# Patient Record
Sex: FEMALE | Race: WHITE | NOT HISPANIC OR LATINO | Employment: UNEMPLOYED | URBAN - METROPOLITAN AREA
[De-identification: names, ages, dates, MRNs, and addresses within clinical notes are randomized per-mention and may not be internally consistent; named-entity substitution may affect disease eponyms.]

---

## 2023-02-15 ENCOUNTER — PREP FOR SURGERY (OUTPATIENT)
Dept: SURGERY | Facility: SURGERY CENTER | Age: 48
End: 2023-02-15
Payer: COMMERCIAL

## 2023-02-15 ENCOUNTER — OFFICE VISIT (OUTPATIENT)
Dept: GASTROENTEROLOGY | Facility: CLINIC | Age: 48
End: 2023-02-15
Payer: COMMERCIAL

## 2023-02-15 VITALS
SYSTOLIC BLOOD PRESSURE: 140 MMHG | TEMPERATURE: 98 F | OXYGEN SATURATION: 99 % | HEART RATE: 105 BPM | WEIGHT: 200.32 LBS | BODY MASS INDEX: 34.2 KG/M2 | DIASTOLIC BLOOD PRESSURE: 82 MMHG | HEIGHT: 64 IN

## 2023-02-15 DIAGNOSIS — Z12.11 ENCOUNTER FOR SCREENING FOR MALIGNANT NEOPLASM OF COLON: Primary | ICD-10-CM

## 2023-02-15 DIAGNOSIS — K62.5 BRIGHT RED BLOOD PER RECTUM: Primary | ICD-10-CM

## 2023-02-15 DIAGNOSIS — K62.5 BRIGHT RED BLOOD PER RECTUM: ICD-10-CM

## 2023-02-15 DIAGNOSIS — R15.1 FECAL SMEARING: ICD-10-CM

## 2023-02-15 PROCEDURE — 99204 OFFICE O/P NEW MOD 45 MIN: CPT

## 2023-02-15 RX ORDER — AMLODIPINE BESYLATE 10 MG/1
1 TABLET ORAL DAILY
COMMUNITY
Start: 2022-12-07

## 2023-02-15 RX ORDER — SODIUM CHLORIDE 0.9 % (FLUSH) 0.9 %
3 SYRINGE (ML) INJECTION EVERY 12 HOURS SCHEDULED
Status: CANCELLED | OUTPATIENT
Start: 2023-02-15

## 2023-02-15 RX ORDER — CHLORAL HYDRATE 500 MG
CAPSULE ORAL
COMMUNITY

## 2023-02-15 RX ORDER — ATORVASTATIN CALCIUM 10 MG/1
TABLET, FILM COATED ORAL
COMMUNITY
Start: 2023-02-13

## 2023-02-15 RX ORDER — SODIUM CHLORIDE, SODIUM LACTATE, POTASSIUM CHLORIDE, CALCIUM CHLORIDE 600; 310; 30; 20 MG/100ML; MG/100ML; MG/100ML; MG/100ML
30 INJECTION, SOLUTION INTRAVENOUS CONTINUOUS PRN
Status: CANCELLED | OUTPATIENT
Start: 2023-02-15

## 2023-02-15 RX ORDER — SODIUM CHLORIDE 0.9 % (FLUSH) 0.9 %
10 SYRINGE (ML) INJECTION AS NEEDED
Status: CANCELLED | OUTPATIENT
Start: 2023-02-15

## 2023-02-15 RX ORDER — LOSARTAN POTASSIUM 100 MG/1
TABLET ORAL
COMMUNITY
Start: 2023-02-13

## 2023-02-15 NOTE — PATIENT INSTRUCTIONS
Schedule colonoscopy, orders placed.    Follow-up visit after colonoscopy to review findings and make additional recommendations if needed.       We recommend starting a daily fiber supplement such as metamucil     These can be purchased over-the-counter, generic brand is fine.  Fiber supplements can take 12 to 72 hours to start working. Make sure to drink 6 to 12 ounces of water or noncarbonated beverage with fiber supplement.     Recommended starting with half of product listed daily dose for 7 days to help reduce risk of abdominal bloating/gas and allow your body to acclimate to fiber diet intake.  If you do not experience any of these adverse effects may increase to daily dose listed on product.    Hemorrhoids:    Hemorrhoids are enlarged or swollen veins in the lower rectum. The most common symptoms of hemorrhoids are rectal bleeding, itching, and pain. You may be able to see or feel hemorrhoids around the outside of the anus, or they may be hidden from view, inside the rectum.    Hemorrhoids are common, occurring in both men and women. Although hemorrhoids do not usually cause serious health problems, they can be annoying and uncomfortable. Fortunately, treatments for hemorrhoids are available and can usually minimize the bothersome symptoms.     SYMPTOMS --     Hemorrhoids are more common in people who are older and in those who have diarrhea or pelvic tumors, during or after pregnancy, and in people who sit for prolonged periods of time and/or strain (push hard) to have a bowel movement.    Symptoms of hemorrhoids can include the following:  ?Painless rectal bleeding  ?Anal itching or pain  ?Tissue bulging around the anus      Itching --     Hemorrhoids commonly cause itching and irritation of skin around the anus.    Pain --     Hemorrhoids can become painful. If you develop severe pain, call your healthcare provider immediately because this may be a sign of a serious problem.      INITIAL HEMORRHOID  TREATMENT --     Type of medicine Examples Notes   Stool softener Docusate sodium (sample brand names: Colace, Docu, Stool Softener) Softens bowel movements  Helps avoid straining while sitting on toilet   We recommend starting a daily fiber supplement such as Citrucel, Metamucil, FiberCon or Benefiber.      These can be purchased over-the-counter, generic brand is fine.  Fiber supplements can take 12 to 72 hours to start working. Make sure to drink 6 to 12 ounces of water or noncarbonated beverage with fiber supplement.    Prevent hard dry stools which make hemorrhoids worse  Might reduce bleeding and other hemorrhoid symptoms  Needs to be taken with plenty of water (8 glasses of water a day)  suggest starting with a small dose and then increasing over time   Warm sitz baths     During a sitz bath, you soak the rectal area in warm water for 10 to 15 minutes two to three times daily. Sitz baths are available in most drugstores. It is also possible to use a bathtub and sit in 2 to 3 inches of warm water. Do not add soap, bubble bath, or other additives in the water. Sitz baths work by improving blood flow and relaxing the muscle around the anus, called the internal anal sphincter.     Medicines to dry or protect skin   Witch hazel (sample brand names: Tucks, Preparation H pads, Preparation H wipes)    Zinc oxide topical paste (sample brand names: Darnell's Butt Paste, Desitin)   Witch hazel wipes or unscented baby wipes can be used to clean the anus after a bowel movement    Zinc oxide also protects skin from irritation  Area must be gently cleaned and allowed to dry before using  Can apply after a sitz bath (soaking in warm, shallow water)     Steroid cream Hydrocortisone rectal cream (sample brand name: Preparation H hydrocortisone) Reduces swelling, and pain caused by hemorrhoids  Do not use for longer than a week  Do not use at all if you are pregnant unless your doctor or nurse tells you to   Medicines to  shrink hemorrhoids Phenylephrine ointment or suppository (sample brand names: Preparation H, Rectacaine) Phenylephrine shrinks swollen hemorrhoids, and relieves itching and discomfort for a few hours  Area must be gently cleaned and allowed to dry before applying

## 2023-02-15 NOTE — PROGRESS NOTES
"Chief Complaint   Patient presents with   • Hematochezia                  History of Present Illness    Patient is a 47 y.o. who presents to the office as a new patient for further evaluation of rectal bleeding after receiving referral from patient's primary care provider Dr. Sarkar.      Denies previous EGD or colonoscopy.    She presents today for further evaluation of rectal bleeding and fecal smearing.  Reports that over the past 8 months she will experience daily bowel movements that are soft formed in consistency without melena or hematochezia.      However in the days leading up to her monthly menstrual cycle her stools will become more firm in consistency and will experience rectal bleeding in toilet water-denies bright red blood with wiping or mixed within stools.    Denies unintentional weight loss or use of fiber supplementation at this time.  Declines rectal exam at this time and will be performed at time of colonoscopy.    She does have an obstetric history that includes delivery of an 8 pound fetus via  after nonprogression of labor.    Denies upper GI symptoms include heartburn, nausea, vomiting, or dysphagia.    Patient reports unsure of past family history secondary to adoption.     Result Review :             Vital Signs:   /82   Pulse 105   Temp 98 °F (36.7 °C)   Ht 162.6 cm (64\")   Wt 90.9 kg (200 lb 5.1 oz)   SpO2 99%   BMI 34.38 kg/m²     Body mass index is 34.38 kg/m².     Physical Exam  Vitals reviewed.   Constitutional:       General: She is not in acute distress.     Appearance: She is well-developed.   HENT:      Head: Normocephalic and atraumatic.   Pulmonary:      Effort: Pulmonary effort is normal. No respiratory distress.   Abdominal:      General: Abdomen is flat. Bowel sounds are normal. There is no distension.      Palpations: Abdomen is soft. There is no mass.      Tenderness: There is no abdominal tenderness. There is no guarding or rebound.      Hernia: No " hernia is present.   Skin:     General: Skin is dry.      Coloration: Skin is not pale.   Neurological:      Mental Status: She is alert and oriented to person, place, and time.   Psychiatric:         Thought Content: Thought content normal.           Assessment and Plan    Diagnoses and all orders for this visit:    1. Bright red blood per rectum (Primary)    2. Fecal smearing           Discussion:    Patient presents today for follow-up with concerns about rectal bleeding-denies Pain or trauma to this area. and fecal smearing, this is an undiagnosed problem.  I recommend proceeding with colonoscopy to assess for diverticulosis, inflammatory bowel disease, microscopic colitis, ulceration, AVM, polyp, hemorrhoids, or mass/malignancy.     For fecal smearing, recommend she start daily fiber supplementation to promote soft formed complete bowel movements.  Long discussion provided on pathophysiology of potential cause to fecal smearing and to consider referral to pelvic floor therapy based on response to daily fiber supplementation and findings on colonoscopy evaluation.    Patient is agreeable to the outlined above treatment plan.  Verbalizes understanding and will contact office for any new or worsening concerns.  All questions answered and support provided.        Patient Instructions     Schedule colonoscopy, orders placed.    Follow-up visit after colonoscopy to review findings and make additional recommendations if needed.       We recommend starting a daily fiber supplement such as metamucil     These can be purchased over-the-counter, generic brand is fine.  Fiber supplements can take 12 to 72 hours to start working. Make sure to drink 6 to 12 ounces of water or noncarbonated beverage with fiber supplement.     Recommended starting with half of product listed daily dose for 7 days to help reduce risk of abdominal bloating/gas and allow your body to acclimate to fiber diet intake.  If you do not experience any  of these adverse effects may increase to daily dose listed on product.    Hemorrhoids:    Hemorrhoids are enlarged or swollen veins in the lower rectum. The most common symptoms of hemorrhoids are rectal bleeding, itching, and pain. You may be able to see or feel hemorrhoids around the outside of the anus, or they may be hidden from view, inside the rectum.    Hemorrhoids are common, occurring in both men and women. Although hemorrhoids do not usually cause serious health problems, they can be annoying and uncomfortable. Fortunately, treatments for hemorrhoids are available and can usually minimize the bothersome symptoms.     SYMPTOMS --     Hemorrhoids are more common in people who are older and in those who have diarrhea or pelvic tumors, during or after pregnancy, and in people who sit for prolonged periods of time and/or strain (push hard) to have a bowel movement.    Symptoms of hemorrhoids can include the following:  ?Painless rectal bleeding  ?Anal itching or pain  ?Tissue bulging around the anus      Itching --     Hemorrhoids commonly cause itching and irritation of skin around the anus.    Pain --     Hemorrhoids can become painful. If you develop severe pain, call your healthcare provider immediately because this may be a sign of a serious problem.      INITIAL HEMORRHOID TREATMENT --     Type of medicine Examples Notes   Stool softener Docusate sodium (sample brand names: Colace, Docu, Stool Softener) Softens bowel movements  Helps avoid straining while sitting on toilet   We recommend starting a daily fiber supplement such as Citrucel, Metamucil, FiberCon or Benefiber.      These can be purchased over-the-counter, generic brand is fine.  Fiber supplements can take 12 to 72 hours to start working. Make sure to drink 6 to 12 ounces of water or noncarbonated beverage with fiber supplement.    Prevent hard dry stools which make hemorrhoids worse  Might reduce bleeding and other hemorrhoid symptoms  Needs  to be taken with plenty of water (8 glasses of water a day)  suggest starting with a small dose and then increasing over time   Warm sitz baths     During a sitz bath, you soak the rectal area in warm water for 10 to 15 minutes two to three times daily. Sitz baths are available in most drugstores. It is also possible to use a bathtub and sit in 2 to 3 inches of warm water. Do not add soap, bubble bath, or other additives in the water. Sitz baths work by improving blood flow and relaxing the muscle around the anus, called the internal anal sphincter.     Medicines to dry or protect skin   Witch hazel (sample brand names: Tucks, Preparation H pads, Preparation H wipes)    Zinc oxide topical paste (sample brand names: Darnell's Butt Paste, Desitin)   Witch hazel wipes or unscented baby wipes can be used to clean the anus after a bowel movement    Zinc oxide also protects skin from irritation  Area must be gently cleaned and allowed to dry before using  Can apply after a sitz bath (soaking in warm, shallow water)     Steroid cream Hydrocortisone rectal cream (sample brand name: Preparation H hydrocortisone) Reduces swelling, and pain caused by hemorrhoids  Do not use for longer than a week  Do not use at all if you are pregnant unless your doctor or nurse tells you to   Medicines to shrink hemorrhoids Phenylephrine ointment or suppository (sample brand names: Preparation H, Rectacaine) Phenylephrine shrinks swollen hemorrhoids, and relieves itching and discomfort for a few hours  Area must be gently cleaned and allowed to dry before applying                          EMR Dragon/Transcription Disclaimer:  This document has been Dictated utilizing Dragon dictation.

## 2023-02-16 NOTE — SIGNIFICANT NOTE
Education provided the Patient on the following:    - Nothing to Eat or Drink after MN the night before the procedure    - Avoid red/purple fluids while completing their bowel prep as ordered by physician  -Contact Gastrointerologist office for any questions about specific details regarding colon prep    -You will need to have someone drive you home after your colonoscopy and remain with you for 24 hours after the procedure  - The date of your Surgery, you may have one visitor at bedside or within 10-15 minutes of [facility]  -Please wear warm socks when you arrive for your colonoscopy  -Remove all jewelry and leave any valuables before arriving the day of your procedure (all will have to be removed before leaving preop)  -You will need to arrive at 1400 on 2/21/23 for your colonoscopy    -Feel free to contact us at: [phone] with any additional questions/concerns

## 2023-02-21 ENCOUNTER — ANESTHESIA (OUTPATIENT)
Dept: SURGERY | Facility: SURGERY CENTER | Age: 48
End: 2023-02-21
Payer: COMMERCIAL

## 2023-02-21 ENCOUNTER — HOSPITAL ENCOUNTER (OUTPATIENT)
Facility: SURGERY CENTER | Age: 48
Setting detail: HOSPITAL OUTPATIENT SURGERY
Discharge: HOME | End: 2023-02-21
Attending: INTERNAL MEDICINE | Admitting: INTERNAL MEDICINE
Payer: COMMERCIAL

## 2023-02-21 ENCOUNTER — ANESTHESIA EVENT (OUTPATIENT)
Dept: SURGERY | Facility: SURGERY CENTER | Age: 48
End: 2023-02-21
Payer: COMMERCIAL

## 2023-02-21 VITALS
WEIGHT: 200 LBS | TEMPERATURE: 97.5 F | HEART RATE: 79 BPM | RESPIRATION RATE: 16 BRPM | HEIGHT: 64 IN | DIASTOLIC BLOOD PRESSURE: 72 MMHG | OXYGEN SATURATION: 99 % | SYSTOLIC BLOOD PRESSURE: 126 MMHG | BODY MASS INDEX: 34.15 KG/M2

## 2023-02-21 DIAGNOSIS — R15.1 FECAL SMEARING: ICD-10-CM

## 2023-02-21 DIAGNOSIS — K62.5 BRIGHT RED BLOOD PER RECTUM: ICD-10-CM

## 2023-02-21 DIAGNOSIS — Z12.11 ENCOUNTER FOR SCREENING FOR MALIGNANT NEOPLASM OF COLON: ICD-10-CM

## 2023-02-21 LAB
B-HCG UR QL: NEGATIVE
EXPIRATION DATE: NORMAL
INTERNAL NEGATIVE CONTROL: NEGATIVE
INTERNAL POSITIVE CONTROL: POSITIVE
Lab: NORMAL

## 2023-02-21 PROCEDURE — 25010000002 PROPOFOL 10 MG/ML EMULSION: Performed by: NURSE ANESTHETIST, CERTIFIED REGISTERED

## 2023-02-21 PROCEDURE — 45385 COLONOSCOPY W/LESION REMOVAL: CPT | Performed by: INTERNAL MEDICINE

## 2023-02-21 PROCEDURE — 81025 URINE PREGNANCY TEST: CPT | Performed by: INTERNAL MEDICINE

## 2023-02-21 PROCEDURE — 88305 TISSUE EXAM BY PATHOLOGIST: CPT | Performed by: INTERNAL MEDICINE

## 2023-02-21 RX ORDER — PROPOFOL 10 MG/ML
VIAL (ML) INTRAVENOUS AS NEEDED
Status: DISCONTINUED | OUTPATIENT
Start: 2023-02-21 | End: 2023-02-21 | Stop reason: SURG

## 2023-02-21 RX ORDER — MAGNESIUM HYDROXIDE 1200 MG/15ML
LIQUID ORAL AS NEEDED
Status: DISCONTINUED | OUTPATIENT
Start: 2023-02-21 | End: 2023-02-21 | Stop reason: HOSPADM

## 2023-02-21 RX ORDER — SODIUM CHLORIDE, SODIUM LACTATE, POTASSIUM CHLORIDE, CALCIUM CHLORIDE 600; 310; 30; 20 MG/100ML; MG/100ML; MG/100ML; MG/100ML
30 INJECTION, SOLUTION INTRAVENOUS CONTINUOUS PRN
Status: DISCONTINUED | OUTPATIENT
Start: 2023-02-21 | End: 2023-02-21 | Stop reason: HOSPADM

## 2023-02-21 RX ORDER — SODIUM CHLORIDE 0.9 % (FLUSH) 0.9 %
10 SYRINGE (ML) INJECTION AS NEEDED
Status: DISCONTINUED | OUTPATIENT
Start: 2023-02-21 | End: 2023-02-21 | Stop reason: HOSPADM

## 2023-02-21 RX ORDER — LIDOCAINE HYDROCHLORIDE 20 MG/ML
INJECTION, SOLUTION INFILTRATION; PERINEURAL AS NEEDED
Status: DISCONTINUED | OUTPATIENT
Start: 2023-02-21 | End: 2023-02-21 | Stop reason: SURG

## 2023-02-21 RX ORDER — SODIUM CHLORIDE 0.9 % (FLUSH) 0.9 %
3 SYRINGE (ML) INJECTION EVERY 12 HOURS SCHEDULED
Status: DISCONTINUED | OUTPATIENT
Start: 2023-02-21 | End: 2023-02-21 | Stop reason: HOSPADM

## 2023-02-21 RX ADMIN — PROPOFOL 100 MG: 10 INJECTION, EMULSION INTRAVENOUS at 14:13

## 2023-02-21 RX ADMIN — PROPOFOL 180 MCG/KG/MIN: 10 INJECTION, EMULSION INTRAVENOUS at 14:13

## 2023-02-21 RX ADMIN — SODIUM CHLORIDE, SODIUM LACTATE, POTASSIUM CHLORIDE, AND CALCIUM CHLORIDE 30 ML/HR: .6; .31; .03; .02 INJECTION, SOLUTION INTRAVENOUS at 12:54

## 2023-02-21 RX ADMIN — LIDOCAINE HYDROCHLORIDE 30 MG: 20 INJECTION, SOLUTION INFILTRATION; PERINEURAL at 14:13

## 2023-02-21 NOTE — ANESTHESIA PREPROCEDURE EVALUATION
Anesthesia Evaluation     Patient summary reviewed and Nursing notes reviewed                Airway   Mallampati: I  No difficulty expected  Dental - normal exam     Pulmonary - normal exam   (-) not a smoker  Cardiovascular - normal exam    (+) hypertension, hyperlipidemia,       Neuro/Psych- negative ROS  GI/Hepatic/Renal/Endo    (+) obesity,  GI bleeding ,     Musculoskeletal     Abdominal    Substance History      OB/GYN          Other                        Anesthesia Plan    ASA 2     MAC       Anesthetic plan, risks, benefits, and alternatives have been provided, discussed and informed consent has been obtained with: patient.        CODE STATUS:

## 2023-02-21 NOTE — ANESTHESIA POSTPROCEDURE EVALUATION
"Patient: Katt Cornejo    Procedure Summary     Date: 02/21/23 Room / Location: [facility]    Anesthesia Start: 1410 Anesthesia Stop: 1435    Procedure: COLONOSCOPY with polypectomy Diagnosis:       Encounter for screening for malignant neoplasm of colon      Bright red blood per rectum      Fecal smearing      (Encounter for screening for malignant neoplasm of colon [Z12.11])      (Bright red blood per rectum [K62.5])      (Fecal smearing [R15.1])    Surgeons: Josep Limon MD Provider: Minna Beth MD    Anesthesia Type: MAC ASA Status: 2          Anesthesia Type: MAC    Vitals  Vitals Value Taken Time   /71 02/21/23 1443   Temp     Pulse 80 02/21/23 1443   Resp 16 02/21/23 1443   SpO2 99 % 02/21/23 1443           Post Anesthesia Care and Evaluation    Patient location during evaluation: bedside  Patient participation: complete - patient participated  Level of consciousness: awake  Pain management: adequate    Airway patency: patent  Anesthetic complications: No anesthetic complications  PONV Status: controlled  Cardiovascular status: acceptable  Respiratory status: acceptable  Hydration status: acceptable    Comments: /71 (BP Location: Left arm, Patient Position: Sitting)   Pulse 80   Temp 36.4 °C (97.5 °F) (Temporal)   Resp 16   Ht 162.6 cm (64\")   Wt 90.7 kg (200 lb)   LMP 01/30/2023 (Exact Date)   SpO2 99%   BMI 34.33 kg/m²         "

## 2023-02-23 LAB
LAB AP CASE REPORT: NORMAL
LAB AP CLINICAL INFORMATION: NORMAL
PATH REPORT.FINAL DX SPEC: NORMAL
PATH REPORT.GROSS SPEC: NORMAL

## 2023-04-04 ENCOUNTER — OFFICE VISIT (OUTPATIENT)
Dept: GASTROENTEROLOGY | Facility: CLINIC | Age: 48
End: 2023-04-04
Payer: COMMERCIAL

## 2023-04-04 VITALS
WEIGHT: 201.1 LBS | TEMPERATURE: 97.3 F | OXYGEN SATURATION: 99 % | BODY MASS INDEX: 34.33 KG/M2 | DIASTOLIC BLOOD PRESSURE: 100 MMHG | SYSTOLIC BLOOD PRESSURE: 140 MMHG | HEART RATE: 65 BPM | HEIGHT: 64 IN

## 2023-04-04 DIAGNOSIS — Z86.010 PERSONAL HISTORY OF COLONIC POLYPS: Primary | ICD-10-CM

## 2023-04-04 DIAGNOSIS — K62.5 BRIGHT RED BLOOD PER RECTUM: ICD-10-CM

## 2023-04-04 DIAGNOSIS — R15.1 FECAL SMEARING: ICD-10-CM

## 2023-04-04 PROCEDURE — 99213 OFFICE O/P EST LOW 20 MIN: CPT

## 2023-04-04 NOTE — PATIENT INSTRUCTIONS
colonoscopy for colon cancer screening in 3 years due February 2026.    We recommend starting a daily fiber supplement such as Metamucil, Benefiber, Citrucel    These can be purchased over-the-counter, generic brand is fine.  Fiber supplements can take 12 to 72 hours to start working. Make sure to drink 6 to 12 ounces of water or noncarbonated beverage with fiber supplement.     Recommended starting with half of product listed daily dose for 7 days to help reduce risk of abdominal bloating/gas and allow your body to acclimate to fiber diet intake.  If you do not experience any of these adverse effects may increase to daily dose listed on product.

## 2023-04-04 NOTE — PROGRESS NOTES
"Chief Complaint   Patient presents with   • Follow-up     Postprocedure follow-up           History of Present Illness    Patient is a 48 y.o. who presents to the office for follow up evaluation.  Last in office visit was on 2/15/2023 for rectal bleeding and fecal smearing.     On 2/21/2023 patient underwent colonoscopy evaluation with Dr. Limon and remarkable for a single 9 mm polyp in the sigmoid colon, nonbleeding internal hemorrhoids, medium mouth diverticula in the descending and ascending colon.    Biopsies remarkable for a tubulovillous adenoma with recommendation to undergo follow-up colonoscopy for colon cancer screening in 3 years due February 2026.    Patient presents to the office today for follow-up discussion after undergoing colonoscopy evaluation.    She denies any upper GI symptoms including heartburn, nausea, vomiting, or dysphagia.    Denies abdominal pain.  Bowel habits are described as occurring daily and are a soft formed consistency without visible melena or hematochezia.  States that she has not experienced subsequent episodes of fecal smearing since undergoing colonoscopy evaluation.      Denies unintentional weight loss.    Patient denies known family history of colon polyps, colon cancer, or IBD.       Result Review :       Office Visit with Dena Uriarte APRN (02/15/2023)  Tissue Pathology Exam (02/21/2023 14:18)        Vital Signs:   /100   Pulse 65   Temp 97.3 °F (36.3 °C)   Ht 162.6 cm (64.02\")   Wt 91.2 kg (201 lb 1.6 oz)   SpO2 99%   BMI 34.50 kg/m²     Body mass index is 34.5 kg/m².     Physical Exam  Vitals reviewed.   Constitutional:       General: She is not in acute distress.     Appearance: She is well-developed.   HENT:      Head: Normocephalic and atraumatic.   Pulmonary:      Effort: Pulmonary effort is normal. No respiratory distress.   Abdominal:      General: Abdomen is flat. Bowel sounds are normal. There is no distension.      Palpations: Abdomen is " soft. There is no mass.      Tenderness: There is no abdominal tenderness. There is no guarding.      Hernia: No hernia is present.   Skin:     General: Skin is dry.      Coloration: Skin is not pale.   Neurological:      Mental Status: She is alert and oriented to person, place, and time.   Psychiatric:         Thought Content: Thought content normal.           Assessment and Plan    Diagnoses and all orders for this visit:    1. Personal history of colonic polyps (Primary)    2. Bright red blood per rectum    3. Fecal smearing           Discussion:    Patient presents today for follow-up discussion after undergoing colonoscopy evaluation.  Reviewed endoscopic results at length with patient.  Overall from a GI standpoint she is doing well.  Recommend continuing on daily fiber supplementation.    Reinforced recommendation for next colonoscopy for colon cancer screening be in 3 years secondary to tubulovillous adenoma noted colonoscopy evaluation.  This will be due February 2026.  Recall active in EMR.    Encouraged her to follow-up with our office as needed or for any new or worsening GI concerns. Patient is agreeable to the outlined above treatment plan.  Verbalizes understanding. All questions answered and support provided.        Patient Instructions   colonoscopy for colon cancer screening in 3 years due February 2026.    We recommend starting a daily fiber supplement such as Metamucil, Benefiber, Citrucel    These can be purchased over-the-counter, generic brand is fine.  Fiber supplements can take 12 to 72 hours to start working. Make sure to drink 6 to 12 ounces of water or noncarbonated beverage with fiber supplement.     Recommended starting with half of product listed daily dose for 7 days to help reduce risk of abdominal bloating/gas and allow your body to acclimate to fiber diet intake.  If you do not experience any of these adverse effects may increase to daily dose listed on product.               EMR  Dragon/Transcription Disclaimer:  This document has been Dictated utilizing Dragon dictation.

## (undated) DEVICE — CANN NASL CO2 TRULINK W/O2 A/

## (undated) DEVICE — GOWN ,SIRUS,NONREINFORCED 3XL: Brand: MEDLINE

## (undated) DEVICE — LASSO POLYPECTOMY SNARE: Brand: LASSO

## (undated) DEVICE — GOWN ISOL W/THUMB UNIV BLU BX/15

## (undated) DEVICE — VIAL FORMLN CAP 10PCT 20ML

## (undated) DEVICE — KT ORCA ORCAPOD DISP STRL

## (undated) DEVICE — Device

## (undated) DEVICE — FLEX ADVANTAGE 1500CC: Brand: FLEX ADVANTAGE

## (undated) DEVICE — THE SINGLE USE ETRAP – POLYP TRAP IS USED FOR SUCTION RETRIEVAL OF ENDOSCOPICALLY REMOVED POLYPS.: Brand: ETRAP